# Patient Record
Sex: MALE | Race: WHITE | Employment: OTHER | URBAN - METROPOLITAN AREA
[De-identification: names, ages, dates, MRNs, and addresses within clinical notes are randomized per-mention and may not be internally consistent; named-entity substitution may affect disease eponyms.]

---

## 2021-08-02 ENCOUNTER — HOSPITAL ENCOUNTER (OUTPATIENT)
Age: 81
Discharge: ANOTHER HOSPITAL | DRG: 871 | End: 2021-08-02
Payer: MEDICARE

## 2021-08-02 ENCOUNTER — HOSPITAL ENCOUNTER (INPATIENT)
Facility: HOSPITAL | Age: 81
LOS: 3 days | Discharge: HOME | DRG: 871 | End: 2021-08-05
Attending: EMERGENCY MEDICINE | Admitting: HOSPITALIST
Payer: MEDICARE

## 2021-08-02 VITALS
BODY MASS INDEX: 33.64 KG/M2 | TEMPERATURE: 97 F | RESPIRATION RATE: 20 BRPM | OXYGEN SATURATION: 95 % | SYSTOLIC BLOOD PRESSURE: 110 MMHG | HEART RATE: 125 BPM | WEIGHT: 235 LBS | HEIGHT: 70 IN | DIASTOLIC BLOOD PRESSURE: 88 MMHG

## 2021-08-02 DIAGNOSIS — R00.0 TACHYCARDIA: ICD-10-CM

## 2021-08-02 DIAGNOSIS — I48.91 ATRIAL FIBRILLATION WITH RVR (HCC): ICD-10-CM

## 2021-08-02 DIAGNOSIS — N28.9 ACUTE RENAL INSUFFICIENCY: ICD-10-CM

## 2021-08-02 DIAGNOSIS — R19.7 DIARRHEA, UNSPECIFIED TYPE: Primary | ICD-10-CM

## 2021-08-02 DIAGNOSIS — E86.0 DEHYDRATION: ICD-10-CM

## 2021-08-02 DIAGNOSIS — R19.7 DIARRHEA OF PRESUMED INFECTIOUS ORIGIN: Primary | ICD-10-CM

## 2021-08-02 LAB
ADENOVIRUS F 40/41 PCR: NEGATIVE
ANION GAP SERPL CALC-SCNC: 9 MMOL/L (ref 0–18)
ASTROVIRUS PCR: NEGATIVE
BASOPHILS # BLD AUTO: 0.07 X10(3) UL (ref 0–0.2)
BASOPHILS NFR BLD AUTO: 0.3 %
BUN BLD-MCNC: 32 MG/DL (ref 7–18)
BUN/CREAT SERPL: 16.9 (ref 10–20)
C CAYETANENSIS DNA SPEC QL NAA+PROBE: NEGATIVE
CALCIUM BLD-MCNC: 9.3 MG/DL (ref 8.5–10.1)
CAMPY SP DNA.DIARRHEA STL QL NAA+PROBE: POSITIVE
CHLORIDE SERPL-SCNC: 103 MMOL/L (ref 98–112)
CO2 SERPL-SCNC: 24 MMOL/L (ref 21–32)
CREAT BLD-MCNC: 1.89 MG/DL
CRYPTOSP DNA SPEC QL NAA+PROBE: NEGATIVE
DEPRECATED RDW RBC AUTO: 46.1 FL (ref 35.1–46.3)
EAEC PAA PLAS AGGR+AATA ST NAA+NON-PRB: NEGATIVE
EC STX1+STX2 + H7 FLIC SPEC NAA+PROBE: NEGATIVE
ENTAMOEBA HISTOLYTICA PCR: NEGATIVE
EOSINOPHIL # BLD AUTO: 0.02 X10(3) UL (ref 0–0.7)
EOSINOPHIL NFR BLD AUTO: 0.1 %
EPEC EAE GENE STL QL NAA+NON-PROBE: NEGATIVE
ERYTHROCYTE [DISTWIDTH] IN BLOOD BY AUTOMATED COUNT: 14.2 % (ref 11–15)
EST. AVERAGE GLUCOSE BLD GHB EST-MCNC: 131 MG/DL (ref 68–126)
ETEC LTA+ST1A+ST1B TOX ST NAA+NON-PROBE: NEGATIVE
GIARDIA LAMBLIA PCR: NEGATIVE
GLUCOSE BLD-MCNC: 198 MG/DL (ref 70–99)
GLUCOSE BLDC GLUCOMTR-MCNC: 174 MG/DL (ref 70–99)
GLUCOSE BLDC GLUCOMTR-MCNC: 191 MG/DL (ref 70–99)
HAV IGM SER QL: 1.8 MG/DL (ref 1.6–2.6)
HBA1C MFR BLD HPLC: 6.2 % (ref ?–5.7)
HCT VFR BLD AUTO: 42.8 %
HGB BLD-MCNC: 14.5 G/DL
IMM GRANULOCYTES # BLD AUTO: 0.2 X10(3) UL (ref 0–1)
IMM GRANULOCYTES NFR BLD: 1 %
LACTATE SERPL-SCNC: 1.9 MMOL/L (ref 0.4–2)
LACTATE SERPL-SCNC: 2.9 MMOL/L (ref 0.4–2)
LYMPHOCYTES # BLD AUTO: 0.34 X10(3) UL (ref 1–4)
LYMPHOCYTES NFR BLD AUTO: 1.7 %
MCH RBC QN AUTO: 30.1 PG (ref 26–34)
MCHC RBC AUTO-ENTMCNC: 33.9 G/DL (ref 31–37)
MCV RBC AUTO: 89 FL
MONOCYTES # BLD AUTO: 3.59 X10(3) UL (ref 0.1–1)
MONOCYTES NFR BLD AUTO: 17.6 %
NEUTROPHILS # BLD AUTO: 16.18 X10 (3) UL (ref 1.5–7.7)
NEUTROPHILS # BLD AUTO: 16.18 X10(3) UL (ref 1.5–7.7)
NEUTROPHILS NFR BLD AUTO: 79.3 %
NOROVIRUS GI/GII PCR: NEGATIVE
OSMOLALITY SERPL CALC.SUM OF ELEC: 294 MOSM/KG (ref 275–295)
P SHIGELLOIDES DNA STL QL NAA+PROBE: NEGATIVE
PLATELET # BLD AUTO: 101 10(3)UL (ref 150–450)
POTASSIUM SERPL-SCNC: 3.6 MMOL/L (ref 3.5–5.1)
RBC # BLD AUTO: 4.81 X10(6)UL
ROTAVIRUS A PCR: NEGATIVE
SALMONELLA DNA SPEC QL NAA+PROBE: NEGATIVE
SAPOVIRUS PCR: NEGATIVE
SHIGELLA SP+EIEC IPAH ST NAA+NON-PROBE: NEGATIVE
SODIUM SERPL-SCNC: 136 MMOL/L (ref 136–145)
V CHOLERAE DNA SPEC QL NAA+PROBE: NEGATIVE
VIBRIO DNA SPEC NAA+PROBE: NEGATIVE
WBC # BLD AUTO: 20.4 X10(3) UL (ref 4–11)
YERSINIA DNA SPEC NAA+PROBE: NEGATIVE

## 2021-08-02 PROCEDURE — 99222 1ST HOSP IP/OBS MODERATE 55: CPT | Performed by: INTERNAL MEDICINE

## 2021-08-02 PROCEDURE — 99223 1ST HOSP IP/OBS HIGH 75: CPT | Performed by: HOSPITALIST

## 2021-08-02 PROCEDURE — 99205 OFFICE O/P NEW HI 60 MIN: CPT | Performed by: NURSE PRACTITIONER

## 2021-08-02 RX ORDER — VANCOMYCIN HYDROCHLORIDE 125 MG/1
125 CAPSULE ORAL ONCE
Status: COMPLETED | OUTPATIENT
Start: 2021-08-02 | End: 2021-08-02

## 2021-08-02 RX ORDER — CARVEDILOL 6.25 MG/1
TABLET ORAL
COMMUNITY
Start: 2021-06-01

## 2021-08-02 RX ORDER — METOCLOPRAMIDE HYDROCHLORIDE 5 MG/ML
5 INJECTION INTRAMUSCULAR; INTRAVENOUS EVERY 8 HOURS PRN
Status: DISCONTINUED | OUTPATIENT
Start: 2021-08-02 | End: 2021-08-05

## 2021-08-02 RX ORDER — SODIUM CHLORIDE 9 MG/ML
INJECTION, SOLUTION INTRAVENOUS CONTINUOUS
Status: DISCONTINUED | OUTPATIENT
Start: 2021-08-02 | End: 2021-08-05

## 2021-08-02 RX ORDER — LISINOPRIL 40 MG/1
TABLET ORAL
COMMUNITY

## 2021-08-02 RX ORDER — VANCOMYCIN HYDROCHLORIDE 125 MG/1
125 CAPSULE ORAL EVERY 6 HOURS
Status: DISCONTINUED | OUTPATIENT
Start: 2021-08-02 | End: 2021-08-04

## 2021-08-02 RX ORDER — MAGNESIUM OXIDE 400 MG (241.3 MG MAGNESIUM) TABLET
400 TABLET ONCE
Status: COMPLETED | OUTPATIENT
Start: 2021-08-02 | End: 2021-08-02

## 2021-08-02 RX ORDER — ACETAMINOPHEN 500 MG
1000 TABLET ORAL ONCE
Status: COMPLETED | OUTPATIENT
Start: 2021-08-02 | End: 2021-08-02

## 2021-08-02 RX ORDER — AMLODIPINE BESYLATE 5 MG/1
TABLET ORAL
COMMUNITY
Start: 2020-06-02

## 2021-08-02 RX ORDER — ACETAMINOPHEN 325 MG/1
650 TABLET ORAL EVERY 6 HOURS PRN
Status: DISCONTINUED | OUTPATIENT
Start: 2021-08-02 | End: 2021-08-05

## 2021-08-02 RX ORDER — LEVOTHYROXINE SODIUM 0.05 MG/1
TABLET ORAL
COMMUNITY

## 2021-08-02 RX ORDER — HEPARIN SODIUM 5000 [USP'U]/ML
5000 INJECTION, SOLUTION INTRAVENOUS; SUBCUTANEOUS EVERY 12 HOURS SCHEDULED
Status: DISCONTINUED | OUTPATIENT
Start: 2021-08-02 | End: 2021-08-05

## 2021-08-02 RX ORDER — ATORVASTATIN CALCIUM 10 MG/1
10 TABLET, FILM COATED ORAL DAILY
Status: DISCONTINUED | OUTPATIENT
Start: 2021-08-02 | End: 2021-08-05

## 2021-08-02 RX ORDER — LEVOTHYROXINE SODIUM 0.05 MG/1
50 TABLET ORAL
Status: DISCONTINUED | OUTPATIENT
Start: 2021-08-02 | End: 2021-08-05

## 2021-08-02 RX ORDER — PRAVASTATIN SODIUM 20 MG
40 TABLET ORAL DAILY
COMMUNITY

## 2021-08-02 RX ORDER — CIPROFLOXACIN 2 MG/ML
400 INJECTION, SOLUTION INTRAVENOUS EVERY 12 HOURS
Status: DISCONTINUED | OUTPATIENT
Start: 2021-08-02 | End: 2021-08-05

## 2021-08-02 RX ORDER — DEXTROSE MONOHYDRATE 25 G/50ML
50 INJECTION, SOLUTION INTRAVENOUS
Status: DISCONTINUED | OUTPATIENT
Start: 2021-08-02 | End: 2021-08-05

## 2021-08-02 RX ORDER — LORAZEPAM 1 MG/1
TABLET ORAL
COMMUNITY

## 2021-08-02 RX ORDER — ONDANSETRON 2 MG/ML
4 INJECTION INTRAMUSCULAR; INTRAVENOUS EVERY 6 HOURS PRN
Status: DISCONTINUED | OUTPATIENT
Start: 2021-08-02 | End: 2021-08-05

## 2021-08-02 RX ORDER — LORAZEPAM 1 MG/1
1 TABLET ORAL EVERY 6 HOURS PRN
Status: DISCONTINUED | OUTPATIENT
Start: 2021-08-02 | End: 2021-08-05

## 2021-08-02 NOTE — ED INITIAL ASSESSMENT (HPI)
Pt state he has had watery diarrhea 2-3 episodes a day for 2 days. Denies any blood in colostomy bag. Pt able to eat and drink without difficulty. Denies any abdominal pain.

## 2021-08-02 NOTE — ED QUICK NOTES
Orders for admission, patient is aware of plan and ready to go upstairs. Any questions, please call ED KAITLYN Grewal Bound  at extension [phone].    Type of COVID test sent: None  COVID Suspicion level: Low/Vaccinated    Titratable drug(s) infusin.9% NaCl bolus, Car

## 2021-08-02 NOTE — H&P
[facility]    PATIENT'S NAME: Brendan Santos   ATTENDING PHYSICIAN: Saji Serna MD   PATIENT ACCOUNT#:   [de-identified]    LOCATION:  [place] [address]  MEDICAL RECORD #:   [id number]       YOB: 1940  ADMISSION DATE: FAMILY HISTORY:  Positive for heart disease and hypertension. SOCIAL HISTORY:  Ex-tobacco user. Lives with his wife. No current tobacco, alcohol, or drug use. Usually independent in his basic activities of daily living.     REVIEW OF SYSTEMS:  The Monitor Accu-Cheks. Obtain Gastroenterology and Cardiology consults. Fall precautions. Further recommendations to follow. ADDENDUM (Job [id number]):    GI PCR panel came back positive for Campylobacter organism. Patient was started on IV ciprofloxacin.

## 2021-08-02 NOTE — ED PROVIDER NOTES
Patient Seen in: [facility] Emergency Department      History   Patient presents with:  Abdomen/Flank Pain    Stated Complaint: diarrhea x 3 days    HPI/Subjective:   HPI    68-year-old male with colostomy presents for evaluation for diarrhea for Head: Normocephalic and atraumatic. Eyes:      General: Lids are normal.      Extraocular Movements: Extraocular movements intact. Cardiovascular:      Rate and Rhythm: Normal rate and regular rhythm. Heart sounds: Normal heart sounds.    Pulmonary Pcr Positive (*)     All other components within normal limits    Narrative:      The GI Panel tests for Campylobacter species jejuni, coli, and   upsaliensis; all species, subspecies, and serovars of Salmonella;   and Vibrio species parahaemolyticus, vulni Strip: Rate 115 atrial fibrillation The cardiac monitor was ordered secondary to the patient's tachycardia and arrhythmia.      Total Critical Care Time: 60 minutes including time spent examining and re-evaluating the patient, ordering and reviewing laborat

## 2021-08-03 ENCOUNTER — APPOINTMENT (OUTPATIENT)
Dept: CV DIAGNOSTICS | Facility: HOSPITAL | Age: 81
DRG: 871 | End: 2021-08-03
Attending: HOSPITALIST
Payer: MEDICARE

## 2021-08-03 LAB
ANION GAP SERPL CALC-SCNC: 12 MMOL/L (ref 0–18)
BASOPHILS # BLD: 0 X10(3) UL (ref 0–0.2)
BASOPHILS NFR BLD: 0 %
BUN BLD-MCNC: 25 MG/DL (ref 7–18)
BUN/CREAT SERPL: 20 (ref 10–20)
C DIFF TOX B STL QL: NEGATIVE
CALCIUM BLD-MCNC: 7.8 MG/DL (ref 8.5–10.1)
CHLORIDE SERPL-SCNC: 107 MMOL/L (ref 98–112)
CO2 SERPL-SCNC: 20 MMOL/L (ref 21–32)
CREAT BLD-MCNC: 1.25 MG/DL
DEPRECATED RDW RBC AUTO: 45.9 FL (ref 35.1–46.3)
DOHLE BOD BLD QL SMEAR: PRESENT
EOSINOPHIL # BLD: 0 X10(3) UL (ref 0–0.7)
EOSINOPHIL NFR BLD: 0 %
ERYTHROCYTE [DISTWIDTH] IN BLOOD BY AUTOMATED COUNT: 14.2 % (ref 11–15)
GLUCOSE BLD-MCNC: 169 MG/DL (ref 70–99)
GLUCOSE BLDC GLUCOMTR-MCNC: 128 MG/DL (ref 70–99)
GLUCOSE BLDC GLUCOMTR-MCNC: 135 MG/DL (ref 70–99)
GLUCOSE BLDC GLUCOMTR-MCNC: 142 MG/DL (ref 70–99)
GLUCOSE BLDC GLUCOMTR-MCNC: 155 MG/DL (ref 70–99)
HAV IGM SER QL: 1.7 MG/DL (ref 1.6–2.6)
HCT VFR BLD AUTO: 37.5 %
HGB BLD-MCNC: 12.8 G/DL
LYMPHOCYTES NFR BLD: 0.35 X10(3) UL (ref 1–4)
LYMPHOCYTES NFR BLD: 2 %
MCH RBC QN AUTO: 30.2 PG (ref 26–34)
MCHC RBC AUTO-ENTMCNC: 34.1 G/DL (ref 31–37)
MCV RBC AUTO: 88.4 FL
METAMYELOCYTES # BLD: 0.18 X10(3) UL
METAMYELOCYTES NFR BLD: 1 %
MONOCYTES # BLD: 2.63 X10(3) UL (ref 0.1–1)
MONOCYTES NFR BLD: 15 %
MORPHOLOGY: NORMAL
NEUTROPHILS # BLD AUTO: 13.26 X10 (3) UL (ref 1.5–7.7)
NEUTROPHILS NFR BLD: 59 %
NEUTS BAND NFR BLD: 23 %
NEUTS HYPERSEG # BLD: 14.35 X10(3) UL (ref 1.5–7.7)
NEUTS VAC BLD QL SMEAR: PRESENT
OSMOLALITY SERPL CALC.SUM OF ELEC: 296 MOSM/KG (ref 275–295)
PLATELET # BLD AUTO: 83 10(3)UL (ref 150–450)
PLATELET MORPHOLOGY: NORMAL
POTASSIUM SERPL-SCNC: 2.9 MMOL/L (ref 3.5–5.1)
POTASSIUM SERPL-SCNC: 3.4 MMOL/L (ref 3.5–5.1)
RBC # BLD AUTO: 4.24 X10(6)UL
SODIUM SERPL-SCNC: 139 MMOL/L (ref 136–145)
TOTAL CELLS COUNTED: 100
WBC # BLD AUTO: 17.5 X10(3) UL (ref 4–11)

## 2021-08-03 PROCEDURE — 93306 TTE W/DOPPLER COMPLETE: CPT | Performed by: HOSPITALIST

## 2021-08-03 PROCEDURE — 99233 SBSQ HOSP IP/OBS HIGH 50: CPT | Performed by: HOSPITALIST

## 2021-08-03 RX ORDER — POTASSIUM CHLORIDE 20 MEQ/1
40 TABLET, EXTENDED RELEASE ORAL ONCE
Status: COMPLETED | OUTPATIENT
Start: 2021-08-03 | End: 2021-08-03

## 2021-08-03 RX ORDER — GARLIC EXTRACT 500 MG
1 CAPSULE ORAL 2 TIMES DAILY
Status: DISCONTINUED | OUTPATIENT
Start: 2021-08-03 | End: 2021-08-05

## 2021-08-03 RX ORDER — POTASSIUM CHLORIDE 20 MEQ/1
40 TABLET, EXTENDED RELEASE ORAL EVERY 4 HOURS
Status: COMPLETED | OUTPATIENT
Start: 2021-08-03 | End: 2021-08-03

## 2021-08-03 RX ORDER — MAGNESIUM SULFATE HEPTAHYDRATE 40 MG/ML
2 INJECTION, SOLUTION INTRAVENOUS ONCE
Status: COMPLETED | OUTPATIENT
Start: 2021-08-03 | End: 2021-08-03

## 2021-08-03 NOTE — PLAN OF CARE
Problem: Patient Centered Care  Goal: Patient preferences are identified and integrated in the patient's plan of care  Description: Interventions:  - What would you like us to know as we care for you?  Visiting from   Problem: Patient/Family Goals  Goal needed  - Optimize oral hygiene and moisture  - Encourage food from home; allow for food preferences  - Enhance eating environment  Outcome: Progressing    assistance  -pain controlled  - See additional Care Plan goals for specific interventions  Outcome:

## 2021-08-03 NOTE — PLAN OF CARE
Angeli Stephenson is from home with his wife. Admitted from ED for water diarrhea for 3 days. He is alert and oriented. Denies chest pain/SOB. He has a 2 piece- colostomy. He continues to have watery diarrhea. Cipro antibiotic started.    Cardizem drip stopped during for food preferences  - Enhance eating environment  Outcome: Progressing     Problem: METABOLIC/FLUID AND ELECTROLYTES - ADULT  Goal: Glucose maintained within prescribed range  Description: INTERVENTIONS:  - Monitor Blood Glucose as ordered  - Assess for

## 2021-08-03 NOTE — CONSULTS
[facility]  Report of GI Consultation    Yohana Hunter Patient Status:  Inpatient    1940 MRN [id number]   Location [facility] 3W/SW Attending Kelly Tenorio MD   Hosp Day # 0 PCP PHYSICIAN NONSTAFF     Date of Admissio for food poisoning. Receiving IV fluid bolus when seen this evening. Surprisingly bright jovial positive this evening despite the above history. Mr. Elmer Segura continues to deny abdominal pain or nausea.       Unclear how much of his colon remains in Mr. Zhu Fearing showing Campylobacter bacteria. Patient does not recall any suspect meals for food poisoning. Receiving IV fluid bolus when seen this evening. Surprisingly bright jovial positive this evening despite the above history.   Mr. Giselle Benson continues to deny abd day  0.9% NaCl infusion, , Intravenous, Continuous  Heparin Sodium (Porcine) 5000 UNIT/ML injection 5,000 Units, 5,000 Units, Subcutaneous, 2 times per day  acetaminophen (TYLENOL) tab 650 mg, 650 mg, Oral, Q6H PRN  ondansetron (ZOFRAN) injection 4 mg, 4 m Normocephalic; pupils equally round and reactive to light; no temporal wasting; no thyromegaly or cervical lymphadenopathy  PULM: Lungs clear to auscultation anteriorly  CV: RRR moderate tachycardia  ABD: Normoactive bowel sounds; soft/nondistended nontend

## 2021-08-03 NOTE — CONSULTS
[facility]  Cardiology Consultation    Lyric Hunter Patient Status:  Inpatient    1940 MRN [id number]   Location [facility] 3W/SW Attending Chelsie Chavis MD   Hosp Day # 1 PCP PHYSICIAN NONSTAFF     Date of Admiss Patient Guardians:  Not on file    Other Topics            Concern    None on file    Social History Narrative    None on file          Current Medications:  potassium chloride (K-DUR M20) CR tab 40 mEq, 40 mEq, Oral, Q4H  vancomycin (VANCOCIN) cap 125 mg, Allergies  No Known Allergies    Review of Systems:   14 point ROS negative unless otherwise stated in HPI    Physical Exam:     Patient Vitals for the past 24 hrs:   BP Temp Temp src Pulse Resp SpO2 Height Weight   08/03/21 0444 — — — — — 93 % — 228 lb 1 Infusions:   • dilTIAZem Stopped (08/02/21 1811)   • sodium chloride 100 mL/hr at 08/03/21 0726     General: Alert and oriented x 3. No apparent distress. HEENT: Normocephalic, anicteric sclera, neck supple, no thyromegaly or adenopathy.   Neck: No JVD, c after resuscitation with IVF, likely secondary to acute infection and dehydration  - Continue home dose of oral amiodarone  - Discussed possible anticoagulation in setting of atrial fibrillation, will defer for now; consider outpatient event monitor on ret

## 2021-08-03 NOTE — PROGRESS NOTES
[facility]    Progress Note    Musa Hunter Patient Status:  Inpatient    1940 MRN [id number]   Location [facility] 3W/SW Attending Celeste Betancourt MD   Hosp Day # 1 PCP PHYSICIAN NONSTAFF     CC: fever diarrhea   Jimmie Nugent chloride 0.9%  1,300 mL Intravenous Once   • ciprofloxacin  400 mg Intravenous Q12H   • Levothyroxine Sodium  50 mcg Oral Before breakfast   • atorvastatin  10 mg Oral Daily       Current PRN Inpatient Meds:      dilTIAZem BOLUS FROM BAG, acetaminophen, on rupture with bypass grafting and partial colectomy. Hypothyroidism  Hyperlipidemia  PAD  Prostate CA s/p external beam radiation therapy       Quality:  · Diet: soft   · PT/OT: will order  · DVT Prophylaxis: heparin   · CODE status: Full.    · Dispo: per

## 2021-08-04 LAB
ANION GAP SERPL CALC-SCNC: 5 MMOL/L (ref 0–18)
BUN BLD-MCNC: 28 MG/DL (ref 7–18)
BUN/CREAT SERPL: 26.4 (ref 10–20)
CALCIUM BLD-MCNC: 8 MG/DL (ref 8.5–10.1)
CHLORIDE SERPL-SCNC: 112 MMOL/L (ref 98–112)
CO2 SERPL-SCNC: 25 MMOL/L (ref 21–32)
CREAT BLD-MCNC: 1.06 MG/DL
DEPRECATED RDW RBC AUTO: 47.8 FL (ref 35.1–46.3)
ERYTHROCYTE [DISTWIDTH] IN BLOOD BY AUTOMATED COUNT: 14.4 % (ref 11–15)
GLUCOSE BLD-MCNC: 122 MG/DL (ref 70–99)
GLUCOSE BLDC GLUCOMTR-MCNC: 116 MG/DL (ref 70–99)
GLUCOSE BLDC GLUCOMTR-MCNC: 121 MG/DL (ref 70–99)
GLUCOSE BLDC GLUCOMTR-MCNC: 136 MG/DL (ref 70–99)
GLUCOSE BLDC GLUCOMTR-MCNC: 141 MG/DL (ref 70–99)
HAV IGM SER QL: 2.2 MG/DL (ref 1.6–2.6)
HCT VFR BLD AUTO: 35.3 %
HGB BLD-MCNC: 11.9 G/DL
MCH RBC QN AUTO: 30.5 PG (ref 26–34)
MCHC RBC AUTO-ENTMCNC: 33.7 G/DL (ref 31–37)
MCV RBC AUTO: 90.5 FL
OSMOLALITY SERPL CALC.SUM OF ELEC: 301 MOSM/KG (ref 275–295)
PLATELET # BLD AUTO: 77 10(3)UL (ref 150–450)
POTASSIUM SERPL-SCNC: 3.4 MMOL/L (ref 3.5–5.1)
RBC # BLD AUTO: 3.9 X10(6)UL
SODIUM SERPL-SCNC: 142 MMOL/L (ref 136–145)
WBC # BLD AUTO: 11.2 X10(3) UL (ref 4–11)

## 2021-08-04 PROCEDURE — 99233 SBSQ HOSP IP/OBS HIGH 50: CPT | Performed by: PHYSICIAN ASSISTANT

## 2021-08-04 PROCEDURE — 99233 SBSQ HOSP IP/OBS HIGH 50: CPT | Performed by: HOSPITALIST

## 2021-08-04 RX ORDER — POTASSIUM CHLORIDE 20 MEQ/1
40 TABLET, EXTENDED RELEASE ORAL ONCE
Status: COMPLETED | OUTPATIENT
Start: 2021-08-04 | End: 2021-08-04

## 2021-08-04 NOTE — PROGRESS NOTES
[facility]    Progress Note    Gasper Caceres Dale Patient Status:  Inpatient    1940 MRN [id number]   Location [facility] 3W/SW Attending Mariusz Dixon MD   [facility] Day # 2 PCP PHYSICIAN NONSTAFF     CC: fever diarrhea   dINK [facility] Sodium  50 mcg Oral Before breakfast   • atorvastatin  10 mg Oral Daily       Current PRN Inpatient Meds:      dilTIAZem BOLUS FROM BAG, acetaminophen, ondansetron, metoclopramide, glucose **OR** glucose-vitamin C **OR** dextrose **OR** glucose **OR** gluc stable. PAT  Dehydration   - BUN/Creat better today- resolved  - cont IVF  - secondary to ATN from volume loss/dehydration     Hyperglycemia  Prediabetes  - Hba1c 6.2   - dietitian to see.      Other medical problems  H/o aortic insuff  AAA rupture with

## 2021-08-04 NOTE — PLAN OF CARE
Josie Law is alert and oriented. Denies chest pain/SOB. He continues to have watery diarrhea via his colostomy. Continue hydration. He is on IVF @ 100 ml/hr. Plan to continue antibiotics for patient. Plan for home upon discharge.      Problem: Patient Cent range  Description: INTERVENTIONS:  - Monitor Blood Glucose as ordered  - Assess for signs and symptoms of hyperglycemia and hypoglycemia  - Administer ordered medications to maintain glucose within target range  - Assess barriers to adequate nutritional i

## 2021-08-04 NOTE — ED PROVIDER NOTES
Patient Seen in: [facility]      History   Patient presents with:  Diarrhea    Stated Complaint: Diarrhea    HPI/Subjective:   HPI    82-year-old male with a history of hypertension, hyperlipidemia, has a colostomy bag, reports that over the bilaterally. EOMs intact. No facial droop or slurred speech. No oral pallor. Mucous membranes are dry, crusted and tacky    Neck: No cervical lymphadenopathy. No stridor. Supple. No meningsmus. Heart: S1-S2. Regular rate and rhythm.        Lungs: g

## 2021-08-04 NOTE — PROGRESS NOTES
[facility]     Cardiology Progress Note    Subjective:  No chest pain or shortness of breath.     Objective:  /73 (BP Location: Right arm)   Pulse 68   Temp 98.1 °F (36.7 °C) (Oral)   Resp 18   Ht 5' 10\" (1.778 m)   Wt 222 lb (100.7 k contact us with any questions.     Marixa Campoverde MD  [address] [place] [address]

## 2021-08-04 NOTE — PHYSICAL THERAPY NOTE
PHYSICAL THERAPY EVALUATION - INPATIENT     Room Number: 343/343-A  Evaluation Date: 8/4/2021  Type of Evaluation: Initial   Physician Order: PT Eval and Treat    Presenting Problem: GI infection, sepsis, hypokalemia, A fib with RVR  Reason for Therapy: M safely with assist from family. PT recommendation home with assist as needed. Anticipate 1 more session to achieve PT goals. Patient will benefit from continued IP PT services to address these deficits in preparation for discharge.     DISCHARGE RECOMME on room air  HR 70 bpm  BP /89 mmHg    Post-activity, seated:  SPO2 92% on room air  HR 84 bpm  BP /92 mmHg  *denied dizziness, nausea, SOB, or fatigue    AM-PAC '6-Clicks' INPATIENT SHORT FORM - BASIC MOBILITY  How much difficulty does the p #3 Patient is able to ambulate 300 feet with assist device: none at assistance level: independent   Goal #3   Current Status    Goal #4 Patient will negotiate 12 stairs/one curb w/ assistive device and supervision   Goal #4   Current Status    Goal #5 Madie

## 2021-08-04 NOTE — PROGRESS NOTES
[facility]     Gastroenterology Progress Note    Gómez Scott Patient Status:  Inpatient    1940 MRN [id number]   Location [facility] 3W/SW Attending Munira Hanson MD   Hosp Day # 2 PCP PHYSICIAN NONSTAFF and phosphorus  -consideration for colonoscopy examination through colostomy  -lactose free diet  -colonoscopy pending clinical course - if not performed inpatient, would recommend outpatient once acute flare improves   -can discontinue Vancomycin ppx - C.

## 2021-08-04 NOTE — PLAN OF CARE
No new complaints, feels well. Continues diarrhea. IV fluids and encouraging oral intake. Continue IV abx. Seen by PT, no need for walker, up self, independent at home. Continue to monitor.     Problem: Patient Centered Care  Goal: Patient preferences are i Encourage mobilization and activity  - Obtain nutritional consult as needed  - Establish a toileting routine/schedule  - Consider collaborating with pharmacy to review patient's medication profile  Outcome: Progressing  Goal: Maintains adequate nutritional

## 2021-08-05 VITALS
WEIGHT: 225.81 LBS | HEIGHT: 70 IN | HEART RATE: 70 BPM | RESPIRATION RATE: 16 BRPM | BODY MASS INDEX: 32.33 KG/M2 | TEMPERATURE: 98 F | DIASTOLIC BLOOD PRESSURE: 94 MMHG | OXYGEN SATURATION: 93 % | SYSTOLIC BLOOD PRESSURE: 141 MMHG

## 2021-08-05 LAB
ANION GAP SERPL CALC-SCNC: 5 MMOL/L (ref 0–18)
BUN BLD-MCNC: 22 MG/DL (ref 7–18)
BUN/CREAT SERPL: 22.4 (ref 10–20)
CALCIUM BLD-MCNC: 8.9 MG/DL (ref 8.5–10.1)
CHLORIDE SERPL-SCNC: 114 MMOL/L (ref 98–112)
CO2 SERPL-SCNC: 24 MMOL/L (ref 21–32)
CREAT BLD-MCNC: 0.98 MG/DL
DEPRECATED RDW RBC AUTO: 48 FL (ref 35.1–46.3)
ERYTHROCYTE [DISTWIDTH] IN BLOOD BY AUTOMATED COUNT: 14.2 % (ref 11–15)
GLUCOSE BLD-MCNC: 109 MG/DL (ref 70–99)
GLUCOSE BLDC GLUCOMTR-MCNC: 107 MG/DL (ref 70–99)
GLUCOSE BLDC GLUCOMTR-MCNC: 109 MG/DL (ref 70–99)
HCT VFR BLD AUTO: 35.9 %
HGB BLD-MCNC: 11.7 G/DL
MCH RBC QN AUTO: 29.9 PG (ref 26–34)
MCHC RBC AUTO-ENTMCNC: 32.6 G/DL (ref 31–37)
MCV RBC AUTO: 91.8 FL
OSMOLALITY SERPL CALC.SUM OF ELEC: 300 MOSM/KG (ref 275–295)
PLATELET # BLD AUTO: 81 10(3)UL (ref 150–450)
POTASSIUM SERPL-SCNC: 3.6 MMOL/L (ref 3.5–5.1)
RBC # BLD AUTO: 3.91 X10(6)UL
SODIUM SERPL-SCNC: 143 MMOL/L (ref 136–145)
WBC # BLD AUTO: 7.3 X10(3) UL (ref 4–11)

## 2021-08-05 PROCEDURE — 99239 HOSP IP/OBS DSCHRG MGMT >30: CPT | Performed by: HOSPITALIST

## 2021-08-05 PROCEDURE — 99233 SBSQ HOSP IP/OBS HIGH 50: CPT | Performed by: PHYSICIAN ASSISTANT

## 2021-08-05 RX ORDER — CIPROFLOXACIN 500 MG/1
500 TABLET, FILM COATED ORAL 2 TIMES DAILY
Qty: 2 TABLET | Refills: 0 | Status: SHIPPED | OUTPATIENT
Start: 2021-08-05 | End: 2021-08-06

## 2021-08-05 NOTE — CM/SW NOTE
BPCI-[employer] Medicare Program Note:  Plan of care reviewed for care coordination and discharge planning. Noted patient falls under  BPCI/Medicare program, with  for sepsis. HAYDEE tool was used to help determine next care setting.  Thus, [address] recomm

## 2021-08-05 NOTE — DISCHARGE SUMMARY
[facility]    Discharge Summary    [address] [facility] Patient Status:  Inpatient    1940 MRN [id number]   Location [facility] 3W/SW Attending Deisy Santos MD   Hosp Day # 3 PCP PHYSICIAN NONSTAFF     Date of Admission:  tomorrow for a total of 7 days  - GI following   - C.diff neg  - probiotic  - replace electrolytes as warranted  - check blood cx x 2- negative   - WBC trending down.  Lactate 1.9   - discharge home today      Hypokalemia  - replace  - BMP daily      A.fib Discharge Diet: Cardiac diet     Discharge Activity: As tolerated       Discharge Medications      START taking these medications      Instructions Prescription details   ciprofloxacin 500 MG Tabs  Commonly known as: CIPRO      Take 1 tablet (500 mg

## 2021-08-05 NOTE — PROGRESS NOTES
[facility]     Gastroenterology Progress Note    Tahir Villavicencioelor Patient Status:  Inpatient    1940 MRN [id number]   Location [facility] 3W/SW Attending Leila Waters MD   Hosp Day # 3 PCP PHYSICIAN NONSTAFF examination through colostomy - patient to f/u with his primary GI in [place]   -lactose free diet  -see above    35 minute f/u with >50% in face-to-face d/w patient and spouse at bedside, additional time in coordination of care and d/w care team.

## 2021-08-05 NOTE — PLAN OF CARE
Patient resting in bed. Wears CPAP at night. Afib on tele. IV ABX and IVF continued. BP meds on hold. Colostomy bag emptied three times today. Mostly filled with gas.  Call light and belongings within reach    Problem: Diabetes/Glucose Control  Goal: Glucos nutritional intake and initiate nutrition consult as needed  - Instruct patient on self management of diabetes  Outcome: Progressing  Goal: Electrolytes maintained within normal limits  Description: INTERVENTIONS:  - Monitor labs and rhythm and assess jair

## 2021-08-05 NOTE — PLAN OF CARE
Cherie Guest Aox4, RA, IV abx infusing, IV fluids infusing, cardiology and gastrology have signed off on patient, possible discharge today or tomorrow.        Problem: Patient Centered Care  Goal: Patient preferences are identified and integrated in the patient's pl IV or PO as ordered and tolerated  - Evaluate effectiveness of GI medications  - Encourage mobilization and activity  - Obtain nutritional consult as needed  - Establish a toileting routine/schedule  - Consider collaborating with pharmacy to review patient

## 2021-08-05 NOTE — PLAN OF CARE
Pt A/Ox4, RA, denies pain and shortness of breath. Pt to be discharged home today as ordered. Discharge instructions, medications/side effects, prescriptions, and follow up appointments reviewed with pt. Pt verbalized understanding and compliance.  Tele rem